# Patient Record
Sex: FEMALE | Race: WHITE | Employment: FULL TIME | URBAN - METROPOLITAN AREA
[De-identification: names, ages, dates, MRNs, and addresses within clinical notes are randomized per-mention and may not be internally consistent; named-entity substitution may affect disease eponyms.]

---

## 2023-10-20 PROBLEM — E55.9 VITAMIN D DEFICIENCY: Status: ACTIVE | Noted: 2023-10-20

## 2023-10-20 PROBLEM — L83 ACANTHOSIS NIGRICANS: Status: ACTIVE | Noted: 2023-10-20

## 2023-10-20 PROBLEM — N94.6 DYSMENORRHEA IN ADOLESCENT: Status: ACTIVE | Noted: 2023-10-20

## 2023-10-20 PROBLEM — G47.9 SLEEP DISTURBANCE: Status: ACTIVE | Noted: 2023-10-20

## 2023-10-20 PROBLEM — L85.8 KERATOSIS PILARIS: Status: ACTIVE | Noted: 2023-10-20

## 2023-10-20 PROBLEM — Q82.5 CONGENITAL PIGMENTED NEVUS: Status: ACTIVE | Noted: 2023-10-20

## 2023-10-20 PROBLEM — F41.9 ANXIETY AND DEPRESSION: Status: ACTIVE | Noted: 2023-10-20

## 2023-10-20 PROBLEM — F32.A ANXIETY AND DEPRESSION: Status: ACTIVE | Noted: 2023-10-20

## 2023-10-20 PROBLEM — R62.52 SHORT STATURE: Status: ACTIVE | Noted: 2023-10-20

## 2023-10-20 PROBLEM — F51.9 NONORGANIC SLEEP DISORDER: Status: ACTIVE | Noted: 2023-10-20

## 2023-10-20 PROBLEM — M43.9 CURVATURE OF SPINE: Status: ACTIVE | Noted: 2023-10-20

## 2023-10-20 PROBLEM — D22.9 CONGENITAL PIGMENTED NEVUS: Status: ACTIVE | Noted: 2023-10-20

## 2023-10-20 RX ORDER — ACETAMINOPHEN 500 MG
1 TABLET ORAL DAILY
COMMUNITY
Start: 2019-03-23 | End: 2023-10-23 | Stop reason: WASHOUT

## 2023-10-20 RX ORDER — GLUCOSAMINE/CHONDR SU A SOD 750-600 MG
TABLET ORAL
COMMUNITY
End: 2023-10-23 | Stop reason: WASHOUT

## 2023-10-20 RX ORDER — MULTIVIT/IRON SULF/FOLIC ACID 15MG-0.4MG
1 TABLET ORAL DAILY
COMMUNITY
Start: 2019-03-23 | End: 2023-10-23 | Stop reason: WASHOUT

## 2023-10-20 RX ORDER — FLUOXETINE HYDROCHLORIDE 20 MG/1
CAPSULE ORAL
COMMUNITY
Start: 2023-02-28 | End: 2023-10-23 | Stop reason: WASHOUT

## 2023-10-20 RX ORDER — GABAPENTIN 100 MG/1
CAPSULE ORAL
COMMUNITY
Start: 2023-02-28 | End: 2023-10-23 | Stop reason: WASHOUT

## 2023-10-20 RX ORDER — IBUPROFEN 600 MG/1
1 TABLET ORAL EVERY 6 HOURS PRN
COMMUNITY
End: 2023-12-21 | Stop reason: ALTCHOICE

## 2023-10-23 ENCOUNTER — OFFICE VISIT (OUTPATIENT)
Dept: PRIMARY CARE | Facility: CLINIC | Age: 18
End: 2023-10-23
Payer: COMMERCIAL

## 2023-10-23 VITALS
HEIGHT: 60 IN | WEIGHT: 121 LBS | OXYGEN SATURATION: 99 % | DIASTOLIC BLOOD PRESSURE: 80 MMHG | BODY MASS INDEX: 23.75 KG/M2 | TEMPERATURE: 99 F | RESPIRATION RATE: 18 BRPM | HEART RATE: 83 BPM | SYSTOLIC BLOOD PRESSURE: 110 MMHG

## 2023-10-23 DIAGNOSIS — N94.6 PAINFUL MENSTRUAL PERIODS: Primary | ICD-10-CM

## 2023-10-23 DIAGNOSIS — N92.0 MENORRHAGIA WITH REGULAR CYCLE: ICD-10-CM

## 2023-10-23 LAB
ALBUMIN SERPL-MCNC: 4.9 G/DL (ref 3.5–5)
ALP BLD-CCNC: 63 U/L (ref 35–125)
ALT SERPL-CCNC: 7 U/L (ref 5–40)
ANION GAP SERPL CALC-SCNC: 16 MMOL/L
AST SERPL-CCNC: 11 U/L (ref 5–40)
BILIRUB SERPL-MCNC: 2 MG/DL (ref 0.1–1.2)
BUN SERPL-MCNC: 14 MG/DL (ref 8–25)
CALCIUM SERPL-MCNC: 9.5 MG/DL (ref 8.5–10.4)
CHLORIDE SERPL-SCNC: 105 MMOL/L (ref 97–107)
CO2 SERPL-SCNC: 20 MMOL/L (ref 24–31)
CREAT SERPL-MCNC: 0.7 MG/DL (ref 0.4–1.6)
ERYTHROCYTE [DISTWIDTH] IN BLOOD BY AUTOMATED COUNT: 12.2 % (ref 11.5–14.5)
FERRITIN SERPL-MCNC: 74 NG/ML (ref 13–150)
GFR SERPL CREATININE-BSD FRML MDRD: >90 ML/MIN/1.73M*2
GLUCOSE SERPL-MCNC: 76 MG/DL (ref 65–99)
HCT VFR BLD AUTO: 43.9 % (ref 36–46)
HGB BLD-MCNC: 14.8 G/DL (ref 12–16)
IRON SERPL-MCNC: 157 UG/DL (ref 30–160)
MCH RBC QN AUTO: 29.7 PG (ref 26–34)
MCHC RBC AUTO-ENTMCNC: 33.7 G/DL (ref 32–36)
MCV RBC AUTO: 88 FL (ref 80–100)
NRBC BLD-RTO: 0 /100 WBCS (ref 0–0)
PLATELET # BLD AUTO: 239 X10*3/UL (ref 150–450)
PMV BLD AUTO: 11.7 FL (ref 7.5–11.5)
POTASSIUM SERPL-SCNC: 4.4 MMOL/L (ref 3.4–5.1)
PREGNANCY TEST URINE, POC: NEGATIVE
PROT SERPL-MCNC: 7.5 G/DL (ref 5.9–7.9)
RBC # BLD AUTO: 4.99 X10*6/UL (ref 4–5.2)
SODIUM SERPL-SCNC: 141 MMOL/L (ref 133–145)
TSH SERPL DL<=0.05 MIU/L-ACNC: 1.17 MIU/L (ref 0.27–4.2)
WBC # BLD AUTO: 10 X10*3/UL (ref 4.4–11.3)

## 2023-10-23 PROCEDURE — 82728 ASSAY OF FERRITIN: CPT | Performed by: NURSE PRACTITIONER

## 2023-10-23 PROCEDURE — 99204 OFFICE O/P NEW MOD 45 MIN: CPT | Performed by: NURSE PRACTITIONER

## 2023-10-23 PROCEDURE — 81025 URINE PREGNANCY TEST: CPT | Performed by: NURSE PRACTITIONER

## 2023-10-23 PROCEDURE — 1036F TOBACCO NON-USER: CPT | Performed by: NURSE PRACTITIONER

## 2023-10-23 PROCEDURE — 84443 ASSAY THYROID STIM HORMONE: CPT | Performed by: NURSE PRACTITIONER

## 2023-10-23 PROCEDURE — 85027 COMPLETE CBC AUTOMATED: CPT | Performed by: NURSE PRACTITIONER

## 2023-10-23 PROCEDURE — 84075 ASSAY ALKALINE PHOSPHATASE: CPT | Performed by: NURSE PRACTITIONER

## 2023-10-23 PROCEDURE — 83540 ASSAY OF IRON: CPT | Performed by: NURSE PRACTITIONER

## 2023-10-23 PROCEDURE — 36415 COLL VENOUS BLD VENIPUNCTURE: CPT | Performed by: NURSE PRACTITIONER

## 2023-10-23 PROCEDURE — 87800 DETECT AGNT MULT DNA DIREC: CPT | Performed by: NURSE PRACTITIONER

## 2023-10-23 RX ORDER — LEVONORGESTREL AND ETHINYL ESTRADIOL 0.15-0.03
1 KIT ORAL DAILY
Qty: 91 TABLET | Refills: 3 | Status: SHIPPED | OUTPATIENT
Start: 2023-10-23 | End: 2024-10-22

## 2023-10-23 ASSESSMENT — ENCOUNTER SYMPTOMS
ABDOMINAL PAIN: 1
UNEXPECTED WEIGHT CHANGE: 1
BACK PAIN: 1

## 2023-10-23 ASSESSMENT — LIFESTYLE VARIABLES
HOW MANY STANDARD DRINKS CONTAINING ALCOHOL DO YOU HAVE ON A TYPICAL DAY: 1 OR 2
HOW OFTEN DO YOU HAVE A DRINK CONTAINING ALCOHOL: MONTHLY OR LESS
SKIP TO QUESTIONS 9-10: 1
HOW OFTEN DO YOU HAVE SIX OR MORE DRINKS ON ONE OCCASION: NEVER
AUDIT-C TOTAL SCORE: 1

## 2023-10-23 ASSESSMENT — PATIENT HEALTH QUESTIONNAIRE - PHQ9
2. FEELING DOWN, DEPRESSED OR HOPELESS: SEVERAL DAYS
1. LITTLE INTEREST OR PLEASURE IN DOING THINGS: SEVERAL DAYS
10. IF YOU CHECKED OFF ANY PROBLEMS, HOW DIFFICULT HAVE THESE PROBLEMS MADE IT FOR YOU TO DO YOUR WORK, TAKE CARE OF THINGS AT HOME, OR GET ALONG WITH OTHER PEOPLE: NOT DIFFICULT AT ALL
SUM OF ALL RESPONSES TO PHQ9 QUESTIONS 1 AND 2: 2

## 2023-10-23 ASSESSMENT — PAIN SCALES - GENERAL: PAINLEVEL: 0-NO PAIN

## 2023-10-23 NOTE — PROGRESS NOTES
"Subjective   Patient ID: Speedy Garcia is a 17 y.o. female who presents for Menstrual Problem (PT C/O PAINFUL MENSTRAUL PERIODS, WOULD LIKE TO DISCUSS STARTING BIRTH CONTROL .).    Here for painful menses. Has had bad periods entire life, started menses in 4th grade heavy . Has abdominal pain . Sexually active, uses condoms.pt here to established, was seen at [facility], working at  [employer].  Reviwed meds no longer having depression issues    Pelvic Pain  The patient's primary symptoms include pelvic pain. This is a chronic problem. The current episode started more than 1 year ago. The problem occurs intermittently. The problem has been gradually worsening. The pain is moderate. The problem affects both sides. She is not pregnant. Associated symptoms include abdominal pain and back pain. She has tried heating pads, NSAIDs and acetaminophen for the symptoms. The treatment provided mild relief. She is sexually active. No, her partner does not have an STD. She uses nothing for contraception. Her menstrual history has been regular.        Review of Systems   Constitutional:  Positive for unexpected weight change.   Gastrointestinal:  Positive for abdominal pain.   Genitourinary:  Positive for menstrual problem and pelvic pain.   Musculoskeletal:  Positive for back pain.       Objective   /80   Pulse 83   Temp 37.2 °C (99 °F)   Resp 18   Ht 1.511 m (4' 11.5\")   Wt 54.9 kg (121 lb)   LMP 10/10/2023 (Approximate)   SpO2 99%   BMI 24.03 kg/m²     Physical Exam  Constitutional:       General: She is not in acute distress.     Appearance: Normal appearance.   Cardiovascular:      Rate and Rhythm: Normal rate and regular rhythm.      Heart sounds: No murmur heard.  Pulmonary:      Effort: Pulmonary effort is normal.      Breath sounds: Normal breath sounds. No wheezing.   Abdominal:      General: Abdomen is flat. Bowel sounds are normal.      Palpations: Abdomen is soft.   Neurological:      Mental Status: " She is alert.   Psychiatric:         Mood and Affect: Mood normal.         Behavior: Behavior normal.         Assessment/Plan   Problem List Items Addressed This Visit    None  Visit Diagnoses         Codes    Painful menstrual periods    -  Primary N94.6    Relevant Medications    levonorgestreL-ethinyl estrad (Seasonale) 0.15 mg-30 mcg (91) tablet    Other Relevant Orders    C. trachomatis / N. gonorrhoeae, DNA probe    US pelvis    POCT Pregnancy, Urine manually resulted (Completed)    Menorrhagia with regular cycle     N92.0    Relevant Orders    CBC    Iron level    Ferritin    TSH with reflex to Free T4 if abnormal    Comprehensive Metabolic Panel

## 2023-10-24 DIAGNOSIS — R17 ELEVATED BILIRUBIN: ICD-10-CM

## 2023-10-24 LAB
C TRACH RRNA SPEC QL NAA+PROBE: NEGATIVE
N GONORRHOEA DNA SPEC QL PROBE+SIG AMP: NEGATIVE

## 2023-10-25 ENCOUNTER — APPOINTMENT (OUTPATIENT)
Dept: RADIOLOGY | Facility: CLINIC | Age: 18
End: 2023-10-25
Payer: COMMERCIAL

## 2023-10-27 ENCOUNTER — ANCILLARY PROCEDURE (OUTPATIENT)
Dept: RADIOLOGY | Facility: CLINIC | Age: 18
End: 2023-10-27
Payer: COMMERCIAL

## 2023-10-27 DIAGNOSIS — N94.6 PAINFUL MENSTRUAL PERIODS: ICD-10-CM

## 2023-10-27 PROCEDURE — 76856 US EXAM PELVIC COMPLETE: CPT

## 2023-12-21 ENCOUNTER — TELEMEDICINE (OUTPATIENT)
Dept: PRIMARY CARE | Facility: CLINIC | Age: 18
End: 2023-12-21
Payer: COMMERCIAL

## 2023-12-21 DIAGNOSIS — J01.90 ACUTE NON-RECURRENT SINUSITIS, UNSPECIFIED LOCATION: Primary | ICD-10-CM

## 2023-12-21 PROCEDURE — 99212 OFFICE O/P EST SF 10 MIN: CPT | Performed by: FAMILY MEDICINE

## 2023-12-21 RX ORDER — AMOXICILLIN AND CLAVULANATE POTASSIUM 875; 125 MG/1; MG/1
1 TABLET, FILM COATED ORAL 2 TIMES DAILY
Qty: 14 TABLET | Refills: 0 | Status: SHIPPED | OUTPATIENT
Start: 2023-12-21 | End: 2023-12-28

## 2023-12-21 NOTE — LETTER
December 21, 2023     Patient: Speedy Garcia   YOB: 2005   Date of Visit: 12/21/2023       To Whom It May Concern:    Speedy Garcia was seen by me for a virtual visit on 12/21/23.  Please excuse Speedy for her absence from work because of illness.  She may return to work on 12/23/23.    Thank you for your understanding.    Sincerely,     Clotilde Truong MD  [facility]  Board Certified Family Medicine Physician  [phone]

## 2023-12-21 NOTE — PROGRESS NOTES
9 days or so    Chief Complaint: URI sxs  HPI 9 days total--all came on at once--  Everyone in her home got sick and came on quickly    Fever--initially--tylenol helped--no fever since early this week  Chills--yes--resolved  Aches--yes--resolved  Cough--yes  Exhaustion--yes  Sob or wheeze--no  Chest tightness--no  Sore throat--yes--just resolved  Congestion--yes  RN/stuffy nose/PND--yes  Headache--yes--taking ibuprofen with relief--facial  Nausea-- no  Vomiting--no  Diarrhea--no  Appetite--good  Fluids--good  Exposures--no known  No loss of taste or smell  OTC meds--ibuprofen and tylenol    VAXXED ? No  Prior hx of COVID infection? yes    ALL OTHER ROS (-)    General appearance:  Vitals available from patient?No  Alert, oriented, pleasant, in no apparent distress Yes  Answers questions appropriatelyYes  Eyes clear?Yes  Is patient in respiratory distressNo    Throat exam Not Available  Is patient coughing during consult:No  Audible wheezing noted? :No  Pt sounds congested?: Yes  Sniffing or rhinorrhea?: No  Frontal sinus TTP by palpation? No  Maxillary sinus TTP by palpation?No  Tender neck LAD by pt exam?:No  Psychiatric: Affect normalYes  Other relevant physical exam:    Pt location in [place] and consent obtained. Telemedicine appropriate evaluation completed. Appropriate physical exam done.    Sinusitis-- initial infection presented like influenza-rest of family better but her congestion persists  Antibiotic as written--augmentin as written

## 2025-03-30 ENCOUNTER — OFFICE VISIT (OUTPATIENT)
Dept: URGENT CARE | Age: 20
End: 2025-03-30
Payer: COMMERCIAL

## 2025-03-30 VITALS
SYSTOLIC BLOOD PRESSURE: 142 MMHG | OXYGEN SATURATION: 99 % | HEIGHT: 59 IN | RESPIRATION RATE: 18 BRPM | DIASTOLIC BLOOD PRESSURE: 89 MMHG | BODY MASS INDEX: 26.21 KG/M2 | WEIGHT: 130 LBS | HEART RATE: 107 BPM | TEMPERATURE: 98.1 F

## 2025-03-30 DIAGNOSIS — H65.03 BILATERAL ACUTE SEROUS OTITIS MEDIA, RECURRENCE NOT SPECIFIED: Primary | ICD-10-CM

## 2025-03-30 PROCEDURE — 3008F BODY MASS INDEX DOCD: CPT | Performed by: PHYSICIAN ASSISTANT

## 2025-03-30 PROCEDURE — 99203 OFFICE O/P NEW LOW 30 MIN: CPT | Performed by: PHYSICIAN ASSISTANT

## 2025-03-30 RX ORDER — FLUTICASONE PROPIONATE 50 MCG
1 SPRAY, SUSPENSION (ML) NASAL
Qty: 16 G | Refills: 0 | Status: SHIPPED | OUTPATIENT
Start: 2025-03-30 | End: 2026-03-30

## 2025-03-30 RX ORDER — PSEUDOEPHEDRINE HCL 120 MG/1
120 TABLET, FILM COATED, EXTENDED RELEASE ORAL EVERY 12 HOURS
Qty: 24 TABLET | Refills: 0 | Status: SHIPPED | OUTPATIENT
Start: 2025-03-30 | End: 2026-03-30

## 2025-03-30 NOTE — LETTER
March 30, 2025     Patient: Speedy Garcia   YOB: 2005   Date of Visit: 3/30/2025       To Whom It May Concern:    It is my medical opinion that Speedy Garcia may return to work on 04/01/2025 .    If you have any questions or concerns, please don't hesitate to call.         Sincerely,        Levi Barriga PA-C    CC: No Recipients

## 2025-03-30 NOTE — PROGRESS NOTES
"Subjective   Patient ID: Speedy Garcia \"bimal\" is a 19 y.o. female. They present today with a chief complaint of Earache.    History of Present Illness  Patient is a pleasant 19-year-old white female, no significant past medical history, presented to clinic for to complaint of bilateral ear pain.  Patient reporting approximate 2 to 3-day history of bilateral ear pain, right greater than left.  She does report some upper respiratory congestion rhinorrhea postnasal drainage and dry cough as well.  Feels like she has a cold.  She denies any chest pain or shortness of breath.  No sputum production.  No abdominal pain, nausea, vomiting.  No numbness tingling or focal weakness.  She has not tried anything at home for her symptoms.      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    Sacral dimple    Contusion of left lesser toe(s) without damage to nail, initial encounter 02/06/2016    Contusion of fourth toe of left foot    Dysuria 02/06/2016    Dysuria    Encounter for immunization safety counseling 06/30/2020    Immunization counseling    Excoriation (skin-picking) disorder 03/23/2019    Picking own skin    Ganglion, right wrist 04/05/2019    Ganglion of right wrist    Mild intermittent asthma, uncomplicated ([facility]) 02/07/2016    Intermittent asthma    Other infective otitis externa, unspecified ear 06/06/2013    Infective otitis externa    Pain in right wrist 03/10/2018    Arthralgia of right wrist    Personal history of other diseases of the female genital tract 03/28/2019    History of irregular menstrual cycles    Personal history of other diseases of the nervous system and sense organs 08/06/2014    History " "of earache    Personal history of other diseases of the respiratory system 01/27/2015    History of upper respiratory infection    Personal history of other specified conditions 01/27/2015    History of wheezing    Personal history of other specified conditions 01/27/2015    History of urinary incontinence    Personal history of urinary (tract) infections 02/08/2016    History of urinary tract infection    Presence of spectacles and contact lenses 06/30/2020    Wears glasses    Pyuria 02/08/2016    Pyuria    Underweight 01/27/2015    Underweight       No past surgical history on file.     reports that she has never smoked. She has never used smokeless tobacco. She reports that she does not use drugs.    Review of Systems  Review of Systems   HENT:  Positive for ear pain.                                   Objective    Vitals:    03/30/25 1926   BP: 142/89   Pulse: 107   Resp: 18   Temp: 36.7 °C (98.1 °F)   TempSrc: Oral   SpO2: 99%   Weight: 59 kg (130 lb)   Height: 1.499 m (4' 11\")     No LMP recorded.    Physical Exam  Gen.: Vitals noted no distress afebrile. Normal phonation, no stridor, no trismus.     ENT: TMs clear bilaterally however clear air-fluid levels are noted bilaterally, right greater than left, EACs unremarkable. Mastoids nontender. Posterior oropharynx without erythema, exudate, or swelling. Uvula is in the midline and nonedematous. No Ethan's Angina. No trismus, drooling, muffled voice. Maintaining secretions well    Neck: Supple. No meningismus through full range of motion. No lymphadenopathy.     Cardiac: Regular rate rhythm no murmur.     Lungs: Good aeration throughout. No adventitious breath sounds.     Abdomen: Soft nontender nonsurgical throughout. Normoactive bowel sounds.     Extremities: No peripheral edema, negative Homans bilaterally no cords.     Skin: No rash.     Neuro: No focal neurologic deficits.   Procedures    Point of Care Test & Imaging Results from this visit    No results " found.    Diagnostic study results (if any) were reviewed by Levi Barriga PA-C.    Assessment/Plan   Allergies, medications, history, and pertinent labs/EKGs/Imaging reviewed by Levi Barriga PA-C.     Medical Decision Making  Patient was seen eval in the clinic for to complaint of bilateral ear pain with associated upper respiratory congestion.  On exam patient is nontoxic well-appearing respect comfortably no acute distress.  Vital signs are stable, afebrile.  Chest clear, heart is regular, belly is soft and nontender.  ENT exam as above consistent with a bilateral serous otitis media.  Will provide Flonase to be twice daily as well as pseudoephedrine.  She will be discharged home at this time discharged to follow with her primary care physician in the next week.  Reviewed my impression, plan, strict return was report to ED precautions with the patient.  She expresses understanding and agreement plan of care.    Orders and Diagnoses  Diagnoses and all orders for this visit:  Bilateral acute serous otitis media, recurrence not specified  -     pseudoephedrine ER (Sudafed-12 Hour) 120 mg 12 hr tablet; Take 1 tablet (120 mg) by mouth every 12 hours. Do not crush, chew, or split.  -     fluticasone (Flonase) 50 mcg/actuation nasal spray; Administer 1 spray into each nostril twice a day. Shake gently. Before first use, prime pump. After use, clean tip and replace cap.        Medical Admin Record      Follow Up Instructions  No follow-ups on file.    Patient disposition: Home    Electronically signed by Levi Barriga PA-C  7:29 PM